# Patient Record
(demographics unavailable — no encounter records)

---

## 2024-12-03 NOTE — DISCUSSION/SUMMARY
[FreeTextEntry1] : doing well  exam  normal   no wheezing  no   rales   no  distress  cough   more than  2  weeks   send  for  rvp  and   start on  med   Zithromax  for   5  days.